# Patient Record
Sex: MALE | Race: WHITE
[De-identification: names, ages, dates, MRNs, and addresses within clinical notes are randomized per-mention and may not be internally consistent; named-entity substitution may affect disease eponyms.]

---

## 2017-03-24 ENCOUNTER — HOSPITAL ENCOUNTER (EMERGENCY)
Dept: HOSPITAL 10 - FTE | Age: 42
Discharge: HOME | End: 2017-03-24
Payer: MEDICAID

## 2017-03-24 VITALS
HEIGHT: 64 IN | HEIGHT: 64 IN | WEIGHT: 216.05 LBS | BODY MASS INDEX: 36.89 KG/M2 | BODY MASS INDEX: 36.89 KG/M2 | WEIGHT: 216.05 LBS

## 2017-03-24 DIAGNOSIS — H57.11: Primary | ICD-10-CM

## 2017-03-24 PROCEDURE — 99283 EMERGENCY DEPT VISIT LOW MDM: CPT

## 2017-05-12 ENCOUNTER — HOSPITAL ENCOUNTER (EMERGENCY)
Dept: HOSPITAL 10 - E/R | Age: 42
Discharge: HOME | End: 2017-05-12
Payer: MEDICAID

## 2017-05-12 VITALS
WEIGHT: 224.87 LBS | HEIGHT: 70 IN | BODY MASS INDEX: 32.19 KG/M2 | BODY MASS INDEX: 32.19 KG/M2 | WEIGHT: 224.87 LBS | HEIGHT: 70 IN

## 2017-05-12 VITALS — HEART RATE: 74 BPM | DIASTOLIC BLOOD PRESSURE: 68 MMHG | RESPIRATION RATE: 17 BRPM | SYSTOLIC BLOOD PRESSURE: 101 MMHG

## 2017-05-12 DIAGNOSIS — R40.2252: ICD-10-CM

## 2017-05-12 DIAGNOSIS — R07.2: Primary | ICD-10-CM

## 2017-05-12 DIAGNOSIS — R40.2362: ICD-10-CM

## 2017-05-12 DIAGNOSIS — R40.2142: ICD-10-CM

## 2017-05-12 DIAGNOSIS — R07.9: ICD-10-CM

## 2017-05-12 LAB
ADD SCAN DIFF: NO
ANION GAP SERPL CALC-SCNC: 14 MMOL/L (ref 8–16)
APTT BLD: 30.6 SEC (ref 25–35)
BARBITURATES UR-MCNC: NEGATIVE UG/ML
BASOPHILS # BLD AUTO: 0 10^3/UL (ref 0–0.1)
BASOPHILS NFR BLD: 0.4 % (ref 0–2)
BENZODIAZ UR-MCNC: NEGATIVE UG/L
BUN SERPL-MCNC: 10 MG/DL (ref 7–20)
CALCIUM SERPL-MCNC: 8.9 MG/DL (ref 8.4–10.2)
CANNABINOIDS UR-MCNC: NEGATIVE UG/L
CHLORIDE SERPL-SCNC: 103 MMOL/L (ref 97–110)
CO2 SERPL-SCNC: 25 MMOL/L (ref 21–31)
COCAINE UR-MCNC: NEGATIVE NG/ML
CREAT SERPL-MCNC: 0.68 MG/DL (ref 0.61–1.24)
D DIMER PPP FEU-MCNC: 285.86 NG/ML (ref ?–460)
EOSINOPHIL # BLD: 0.3 10^3/UL (ref 0–0.5)
EOSINOPHIL NFR BLD: 3.3 % (ref 0–7)
ERYTHROCYTE [DISTWIDTH] IN BLOOD BY AUTOMATED COUNT: 11.9 % (ref 11.5–14.5)
ETHANOL SERPL-MCNC: < 10 MG/DL
GLUCOSE SERPL-MCNC: 106 MG/DL (ref 70–220)
HCT VFR BLD CALC: 45.5 % (ref 42–52)
HGB BLD-MCNC: 16 G/DL (ref 14–18)
INR PPP: 0.88
LYMPHOCYTES # BLD AUTO: 3 10^3/UL (ref 0.8–2.9)
LYMPHOCYTES NFR BLD AUTO: 39.5 % (ref 15–51)
MCH RBC QN AUTO: 30.7 PG (ref 29–33)
MCHC RBC AUTO-ENTMCNC: 35.2 G/DL (ref 32–37)
MCV RBC AUTO: 87.3 FL (ref 82–101)
MONOCYTES # BLD: 0.6 10^3/UL (ref 0.3–0.9)
MONOCYTES NFR BLD: 8.5 % (ref 0–11)
NEUTROPHILS # BLD: 3.6 10^3/UL (ref 1.6–7.5)
NEUTROPHILS NFR BLD AUTO: 47.9 % (ref 39–77)
NRBC # BLD MANUAL: 0 10^3/UL (ref 0–0)
NRBC BLD QL: 0 /100WBC (ref 0–0)
OPIATES UR-MCNC: NEGATIVE NG/ML
PLATELET # BLD: 249 10^3/UL (ref 140–415)
PMV BLD AUTO: 9.9 FL (ref 7.4–10.4)
POTASSIUM SERPL-SCNC: 3.4 MMOL/L (ref 3.5–5.1)
PROTHROMBIN TIME: 11.9 SEC (ref 12.2–14.2)
PT RATIO: 0.9
RBC # BLD AUTO: 5.21 10^6/UL (ref 4.7–6.1)
SODIUM SERPL-SCNC: 139 MMOL/L (ref 135–144)
TROPONIN I SERPL-MCNC: < 0.012 NG/ML (ref 0–0.12)
WBC # BLD AUTO: 7.5 10^3/UL (ref 4.8–10.8)

## 2017-05-12 PROCEDURE — 85610 PROTHROMBIN TIME: CPT

## 2017-05-12 PROCEDURE — C9113 INJ PANTOPRAZOLE SODIUM, VIA: HCPCS

## 2017-05-12 PROCEDURE — 71010: CPT

## 2017-05-12 PROCEDURE — 84484 ASSAY OF TROPONIN QUANT: CPT

## 2017-05-12 PROCEDURE — 80306 DRUG TEST PRSMV INSTRMNT: CPT

## 2017-05-12 PROCEDURE — 85730 THROMBOPLASTIN TIME PARTIAL: CPT

## 2017-05-12 PROCEDURE — 85378 FIBRIN DEGRADE SEMIQUANT: CPT

## 2017-05-12 PROCEDURE — 85025 COMPLETE CBC W/AUTO DIFF WBC: CPT

## 2017-05-12 PROCEDURE — 93005 ELECTROCARDIOGRAM TRACING: CPT

## 2017-05-12 PROCEDURE — 96374 THER/PROPH/DIAG INJ IV PUSH: CPT

## 2017-05-12 PROCEDURE — 80048 BASIC METABOLIC PNL TOTAL CA: CPT

## 2017-05-12 PROCEDURE — 36415 COLL VENOUS BLD VENIPUNCTURE: CPT

## 2017-05-12 PROCEDURE — 96375 TX/PRO/DX INJ NEW DRUG ADDON: CPT

## 2017-05-12 PROCEDURE — 80307 DRUG TEST PRSMV CHEM ANLYZR: CPT

## 2017-05-12 NOTE — RADRPT
PROCEDURE:   XR Chest. 

 

CLINICAL INDICATION:   Chest Pain. 

 

TECHNIQUE:   Portable single view of the chest

 

COMPARISON:   None. 

 

FINDINGS:

The cardiomediastinal silhouette appears within normal limits.  The lungs are clear and no pleural e
ffusion or significant edema is seen.  No bony abnormality is seen. 

 

IMPRESSION:

No definite acute pulmonary disease. 

 

RPTAT: HLBE

_____________________________________________ 

Alyssa Li Physician           Date    Time 

Electronically viewed and signed by Alyssa Li Physician on 05/12/2017 03:10 

 

D:  05/12/2017 03:10  T:  05/12/2017 03:10

LE/

## 2017-05-12 NOTE — ERA
ER Documentation


Chief Complaint


Date/Time


DATE: 17 


TIME: 01:41


Chief Complaint


CHEST PRESSURE





HPI


The patient is a 41-year-old male, presenting to the ER because of substernal 

chest pressure for 1 day.  He has similar symptoms previously, the chest 

pressure is worse with movement.  He denies chest pain with exertion or 

vomiting or diaphoresis.  He denies fever, chills, neck pain, abdominal pain, 

vomiting, dysuria, diarrhea.  He does not smoke nor drink or use illicit drug





Past medical history/surgical history: None





ROS


All systems reviewed and are negative except as per history of present illness.





Medications


Home Meds


Active Scripts


Acetaminophen* (Tylophen*) 500 Mg Capsule, 1 CAP PO Q6H Y for PAIN AND OR 

ELEVATED TEMP, #20 CAP


   Prov:AUGUSTIN BISHOP PA-C         3/24/17


Naphazoline Hcl/Phenir Mal (Naphcon-A Eye Drops) 15 Ml Drops, 15 ML OP BID, #1 

BOTTLE


   Prov:AUGUSTIN BISHOP PA-C         3/24/17


Polymyxin B Sulfate-TMP* (Polymyxin B-TMP Eye Drops*) 10 Ml Drops, 1 DROP RIGHT 

EYE QID for 7 Days, EA


   Prov:AUGUSTIN BISHOP PA-C         3/24/17





Allergies


Allergies:  


Coded Allergies:  


     No Known Allergy (Unverified , 3/24/17)





PMhx/Soc


History of Surgery:  No


Anesthesia Reaction:  No


Hx Neurological Disorder:  No


Hx Respiratory Disorders:  No


Hx Cardiac Disorders:  No


Hx Psychiatric Problems:  No


Hx Miscellaneous Medical Probl:  No (DENIES MEDICAL AND SURGICAL HX.)


Hx Alcohol Use:  No


Hx Substance Use:  No


Hx Tobacco Use:  No





Physical Exam


Vitals





Vital Signs








  Date Time  Temp Pulse Resp B/P Pulse Ox O2 Delivery O2 Flow Rate FiO2


 


17 04:00  64 12 115/81 98 Room Air  


 


17 03:00  68 16 113/76 100 Room Air  


 


17 01:59  71 19 118/68 100 Room Air  


 


17 01:21 97.0 67 18 152/97 98   








Physical Exam


 Const:      No acute distress.


 Head:        Atraumatic.


 Eyes:       Normal Conjunctiva.


 ENT:         Normal External Ears, Nose and Mouth.


 Neck:        Full range of motion.  No meningismus.


 Resp:         Clear to auscultation bilaterally.


 Cardio:       Regular rate and rhythm, no murmurs.


 Abd:         Soft,  non distended, normal bowel sounds, non tender.


 Skin:         No petechiae or rashes.


 Back:        No midline or flank tenderness.


 Ext:          No cyanosis, or edema.


 Neur:        Awake and alert. No focal deficit


 Psych:        Normal Mood and Affect.


Result Diagram:  


17 0200                                                                   

             17 0200





Results 24 hrs








 Laboratory Tests








Test


  17


02:00 17


02:10


 


White Blood Count 7.510^3/ul  


 


Red Blood Count 5.2110^6/ul  


 


Hemoglobin 16.0g/dl  


 


Hematocrit 45.5%  


 


Mean Corpuscular Volume 87.3fl  


 


Mean Corpuscular Hemoglobin 30.7pg  


 


Mean Corpuscular Hemoglobin


Concent 35.2g/dl 


  


 


 


Red Cell Distribution Width 11.9%  


 


Platelet Count 90345^3/UL  


 


Mean Platelet Volume 9.9fl  


 


Neutrophils % 47.9%  


 


Lymphocytes % 39.5%  


 


Monocytes % 8.5%  


 


Eosinophils % 3.3%  


 


Basophils % 0.4%  


 


Nucleated Red Blood Cells % 0.0/100WBC  


 


Neutrophils # 3.610^3/ul  


 


Lymphocytes # 3.010^3/ul  


 


Monocytes # 0.610^3/ul  


 


Eosinophils # 0.310^3/ul  


 


Basophils # 0.010^3/ul  


 


Nucleated Red Blood Cells # 0.010^3/ul  


 


Prothrombin Time 11.9Sec  


 


Prothrombin Time Ratio 0.9  


 


INR International Normalized


Ratio 0.88 


  


 


 


Activated Partial


Thromboplast Time 30.6Sec 


  


 


 


D-Dimer 285.86ng/ml  


 


D-Dimer Comment   


 


Sodium Level 139mmol/L  


 


Potassium Level 3.4mmol/L  


 


Chloride Level 103mmol/L  


 


Carbon Dioxide Level 25mmol/L  


 


Anion Gap 14  


 


Blood Urea Nitrogen 10mg/dl  


 


Creatinine 0.68mg/dl  


 


Glucose Level 106mg/dl  


 


Calcium Level 8.9mg/dl  


 


Troponin I < 0.012ng/ml  


 


Ethyl Alcohol Level < 10.0mg/dl  


 


Urine Opiates Screen  NEGATIVE 


 


Urine Barbiturates  NEGATIVE 


 


Urine Amphetamines Screen  NEGATIVE 


 


Urine Benzodiazepines Screen  NEGATIVE 


 


Urine Cocaine Screen  NEGATIVE 


 


Urine Cannabinoids  NEGATIVE 








 Current Medications








 Medications


  (Trade)  Dose


 Ordered  Sig/Mary


 Route


 PRN Reason  Start Time


 Stop Time Status Last Admin


Dose Admin


 


 Ketorolac


 Tromethamine


  (Toradol)  30 mg  ONCE  STAT


 IV


   17 01:45


 17 01:47 DC 17 02:07


 


 


 Pantoprazole


  (Protonix Iv)  40 mg  ONCE  ONCE


 IV


   17 02:00


 17 02:01 DC 17 02:07


 


 


 Potassium Chloride


  (Klor-Con 20)  20 meq  ONCE  ONCE


 PO


   17 03:32


 17 03:33 DC  


 














Procedures/[place] [id number]


 Radiology Main Line: [phone]





 DIAGNOSTIC IMAGING REPORT





 Patient: TARAN DALE   : 1975   Age: 41  Sex: M                  

      


 MR #:    [id number]   Acct #:   [id number]    DOS: 17 0145


 Ordering MD: TRINH CHAMBERS MD   Location:  E/R   Room/Bed:                  

                          


 








PROCEDURE:   XR Chest. 


 


CLINICAL INDICATION:   Chest Pain. 


 


TECHNIQUE:   Portable single view of the chest


 


COMPARISON:   None. 


 


FINDINGS:


The cardiomediastinal silhouette appears within normal limits.  The lungs are 

clear and no pleural effusion or significant edema is seen.  No bony 

abnormality is seen. 


 


IMPRESSION:


No definite acute pulmonary disease. 


 


RPTAT: HLBE


_____________________________________________ 


Physician Haritha           Date    Time 


Electronically viewed and signed by Alyssa Li Physician on 2017 03

:10 


 


D:  2017 03:10  T:  2017 03:10


LE/





CC: TRINH CHAMBERS MD





EKG:                At 1:36 AM read by emergency physician


Rate/Rhythm:          Normal Sinus Rhythm 71 beats/min


QRS, ST, T-waves:    No ST elevation, no T inversion


Impression:            Normal    EKG





EKG:                At 2:07 AM read by emergency physician


Rate/Rhythm:          Normal Sinus Rhythm 72 beats/min


QRS, ST, T-waves:    No ST elevation, no T inversion


Impression:            Normal    EKG





MEDICAL MAKING DECISION: The patient is a 41-year-old male, presenting to the 

ER because of acute chest pain of unclear etiology.  He was treated with 

Toradol 30 minute IV and Protonix 40 mg IV with good response





The differential diagnoses considered include but are not limited to acute 

coronary syndrome, acute myocardial infarction, pericarditis, pulmonary embolism

, aortic dissection, pneumonia, pleural effusion, pneumothorax, GERD, chest 

wall pain.





Departure


Diagnosis:  


 Primary Impression:  


 Chest pain


Condition:  Good


Comments


The patient presents with chest pain and I considered pulmonary embolism, 

aortic dissection, pneumothorax among other diagnoses.  Evaluation for acute 

coronary syndrome was performed.  The HEART score (www.mdcalc.com) was utilized 

for risk stratification and found to be < = 3.  Repeat EKG and troponin @ 3 

hours were unchanged.  Based on this evaluation the patients risk of major 

adverse cardiac events is <1%.  Shared decision making occurred with patient 

and the decision has been made to discharge the patient for outpatient 

evaluation and functional study within 72 hours.











TRINH CHAMBERS MD May 12, 2017 01:41

## 2017-06-09 ENCOUNTER — HOSPITAL ENCOUNTER (EMERGENCY)
Dept: HOSPITAL 10 - FTE | Age: 42
Discharge: HOME | End: 2017-06-09
Payer: MEDICAID

## 2017-06-09 VITALS — RESPIRATION RATE: 20 BRPM | DIASTOLIC BLOOD PRESSURE: 82 MMHG | HEART RATE: 94 BPM | SYSTOLIC BLOOD PRESSURE: 125 MMHG

## 2017-06-09 VITALS
BODY MASS INDEX: 35.25 KG/M2 | HEIGHT: 66 IN | BODY MASS INDEX: 35.25 KG/M2 | WEIGHT: 219.36 LBS | WEIGHT: 219.36 LBS | HEIGHT: 66 IN

## 2017-06-09 VITALS — TEMPERATURE: 98.7 F

## 2017-06-09 DIAGNOSIS — J20.9: Primary | ICD-10-CM

## 2017-06-09 DIAGNOSIS — R05: ICD-10-CM

## 2017-06-09 DIAGNOSIS — Z87.891: ICD-10-CM

## 2017-06-09 PROCEDURE — 96372 THER/PROPH/DIAG INJ SC/IM: CPT

## 2017-06-09 PROCEDURE — 71020: CPT

## 2017-06-09 PROCEDURE — 94664 DEMO&/EVAL PT USE INHALER: CPT

## 2017-06-09 NOTE — RADRPT
PROCEDURE:   XR Chest. 

 

CLINICAL INDICATION:   Cough and fever. 

 

TECHNIQUE:   PA and Lateral views of the chest were obtained. 

 

COMPARISON:   No. 

 

FINDINGS:

The soft tissues are normal.  The bony elements are normal.  The heart, cardiomediastinal silhouette
 and hilar structures are normal. The pulmonary vasculature is normal. There is a left-sided aorta. 
The lungs are clear.  There is thickening of the minor fissure which could be the result of a pleura
l effusion.  The costophrenic angles are normal.

 

IMPRESSION:

 

1. Thickening of the minor fissure could be the result of a right pleural effusion or pleural scarri
ng.  No discrete infiltrate is identified.

 

RPTAT:AAJJ

_____________________________________________ 

Physician Fish           Date    Time 

Electronically viewed and signed by Physician Fish on 06/09/2017 19:41 

 

D:  06/09/2017 19:41  T:  06/09/2017 19:41

SERGE/

## 2017-06-09 NOTE — ERD
ER Documentation


Chief Complaint


Date/Time


DATE: 17 


TIME: 18:43


Chief Complaint


Complains of fever x  3 weeks





HPI


42-year-old male who presented emergency department for productive cough and 

intermittent fever for about 3 weeks.  He complains of right upper back pain 

that is described as achy nonradiating.  Stated that his right upper back pain 

is worse whenever he cough.





Denies headache, loss of consciousness, dizziness, blurry vision, changes in 

vision, photophobia, facial pain, ear pain, throat pain, difficulty swallowing, 

neck pain, shoulder pain, chest pain, hemoptysis, abdominal pain, loss of 

appetite, nausea, vomiting, hematochezia, diarrhea, constipation, urinary 

symptoms, bladder and bowel incontinences, extremity weakness, extremity 

tenderness, numbness or tingling sensation, difficulty walking, recent travel, 

recent exposure to illness, recent antibiotic use in the last 3 months. 





Allergy: NKDA.


PMH: Denies. 


Medications: Denies.  


Surgery: Denies.  


Family history: Denies.


Primary Social History: Denies.


Denies smoking, use of alcohol, use of illegal drugs.





ROS


All systems reviewed and are negative except as per history of present illness.





Medications


Home Meds


Active Scripts


Albuterol Sulfate* (Proair HFA*) 8.5 Gm Hfa.aer.ad, 2 PUFF INH Q4, #1 INHALER


   Prov:FRANCHESKAILADALLASRAMÍREZ F         17


Ibuprofen* (Motrin*) 800 Mg Tab, 800 MG PO Q8 Y for PAIN AND OR ELEVATED TEMP, #

30 TAB


   Prov:FRANCHESKAILADALLASDERICKAR F         17


Acetaminophen* (Tylophen*) 500 Mg Capsule, 1 CAP PO Q6H Y for PAIN AND OR 

ELEVATED TEMP, #20 CAP


   Prov:PASILABANRAMÍREZ F         17


Azithromycin* (Zithromax*) 250 Mg Tablet, 250 MG PO .ZPACK AS DIRECTED, #6 TAB


   TAKE 500 MG (2 TABS) THE FIRST DAY THEN 250 MG (1 TAB) DAYS 2-5


   Prov:FRANCHESKAILADERICK HAYNESAR F         17


Ibuprofen* (Motrin*) 600 Mg Tab, 600 MG PO Q6H Y for PAIN AND OR ELEVATED TEMP, 

#30 TAB


   Prov:TRINH CHAMBERS MD         17


Acetaminophen* (Tylophen*) 500 Mg Capsule, 1 CAP PO Q6H Y for PAIN AND OR 

ELEVATED TEMP, #20 CAP


   Prov:AUGUSTIN BISHOP CATA         3/24/17


Naphazoline Hcl/Phenir Mal (Naphcon-A Eye Drops) 15 Ml Drops, 15 ML OP BID, #1 

BOTTLE


   Prov:AUGUSTIN BISHOP CATA         3/24/17


Polymyxin B Sulfate-TMP* (Polymyxin B-TMP Eye Drops*) 10 Ml Drops, 1 DROP RIGHT 

EYE QID for 7 Days, EA


   Prov:AUGUSTIN BISHOP CATA         3/24/17





Allergies


Allergies:  


Coded Allergies:  


     No Known Allergy (Unverified , 3/24/17)





PMhx/Soc


Medical and Surgical Hx:  pt denies Medical Hx, pt denies Surgical Hx


History of Surgery:  No


Anesthesia Reaction:  No


Hx Neurological Disorder:  No


Hx Respiratory Disorders:  No


Hx Cardiac Disorders:  No


Hx Psychiatric Problems:  No


Hx Miscellaneous Medical Probl:  No


Hx Alcohol Use:  No


Hx Substance Use:  No


Hx Tobacco Use:  No (quit)


Smoking Status:  Former smoker





FmHx


Family History:  coronary disease





Physical Exam


Vitals





Vital Signs








  Date Time  Temp Pulse Resp B/P Pulse Ox O2 Delivery O2 Flow Rate FiO2


 


17 21:50 98.7       


 


17 21:22 98.5 94 20 125/82 97 Room Air  


 


17 20:13  74 22  97   21


 


17 18:16 100.6 102 20 132/63 93   








Physical Exam


CONSTITUTIONAL: Well-appearing; well-nourished; in no apparent distress.  


HEAD: Normocephalic; atraumatic.  


EYES: Conjunctiva clear, sclera non-icteric, EOM intact. PERRL 


Ears: Hearing intact. EACs clear, TMs non-bulging, non-inflamed, translucent & 

mobile, ossicles normal appearance, No obstructions, no erythema, no discharges


Nose: No obstructions. No polyps. No external lesions. Mucosa non-inflamed. No 

external lesions, septum and turbinates normal. No rhinorrhea. No discharges. 

Frontal sinus is non-tender to palpation. Maxillary sinus is non-tender to 

palpation. 


MOUTH: Moist mucous membranes, no lesion, no obstructions, no vesicles, no 

thrush, patent airway


Throat: Uvula in midline. Right tonsil is +1 with no erythema, no exudate. Left 

tonsil is +1 with no erythema, no exudate. Tolerating secretions well. Good gag 

reflex. Patent airway.


Neck: Supple, without lesions, bruits, or adenopathy. No mass. Thyroid non-

enlarged and non-tender to palpation.


CHEST: Symmetrical chest. Respirations even and not labored. No retractions 

noted.


CARDIOVASCULAR: Normal S1, S2. RRR. No murmurs, gallops.


RESPIRATORY: Normal chest excursion with respiration; breath sounds clear and 

equal bilaterally; no wheezes, rhonchi, or rales.  Breathing even and 

unlabored. Speaking in clear, full, and complete sentences w/ ease. 


ABDOMEN: Normal bowel sounds normal. Soft, round, non-distended, non-guarding, 

no tenderness, no rebound, no organomegaly, no masses, no pulsating abdominal 

mass. No hernia. No peritoneal signs.


: No CVA tenderness. 


BACK: Symmetrical shoulder. Spine is midline without deformity, tenderness. No 

evidence of trauma or deformity.


PELVIS: Stable pelvis. No evidence of trauma or deformity.  


MUSCULOSKELETAL: Normal gait and station. No misalignment, asymmetry, 

crepitation, defects, tenderness, masses, effusions, decreased range of motion, 

instability, atrophy or abnormal strength or tone in the head, neck, spine, ribs

, pelvis or extremities. No calf tenderness. 


NEUROVASCULAR: Distal pulses are present. Pedal pulse are present, equal, and 

normal. Capillary refills are < 2 seconds.


NEUROLOGIC: Alert and oriented x4. Speaks full and clear sentences. Cranial 

Nerves II-XII normal. Sensation to pain, touch, and proprioception normal. 

Grossly unremarkable. No neurologic deficits. Romberg test is negative.


PSYCHOLOGICAL: The patients mood and manner are appropriate. No hallucinations

, delusions. Not SI. Not HI. Has the capacity to decide for self


SKIN: Normal for age and ethnicity; warm; dry; good turgor; no apparent lesions 

or exudates. No rashes, hives, discoloration. Intact.


Results 24 hrs





 Current Medications








 Medications


  (Trade)  Dose


 Ordered  Sig/Mary


 Route


 PRN Reason  Start Time


 Stop Time Status Last Admin


Dose Admin


 


 Acetaminophen


  (Tylenol Tab)  500 mg  ONCE  STAT


 PO


   17 18:49


 17 18:50 DC 17 18:56


 


 


 Ibuprofen


  (Motrin)  800 mg  ONCE  ONCE


 PO


   17 19:00


 17 19:01 DC 17 18:56


 


 


 Albuterol


  (Proventil


 0.083% (Neb))  5 mg  ONCE  STAT


 HHN


   17 19:38


 17 19:39 DC 17 20:12


 


 


 Ipratropium


 Bromide


  (Atrovent 0.02%


  (Neb))  0.5 mg  ONCE  ONCE


 HHN


   17 20:00


 17 20:01 DC 17 20:12


 


 


 Ceftriaxone Sodium


  (Rocephin)  1 gm  ONCE  ONCE


 IM


   17 20:00


 17 20:01 DC 17 19:50


 


 


 Dexamethasone


  (Decadron)  10 mg  ONCE  ONCE


 PO


   17 20:00


 17 20:01 DC 17 19:50


 











Procedures/MDM


Examination: Please see physical examination.





Disease process, medical treatment was explained to the patient and family 

member. They verbalized understanding and agreed with the diagnostic tests, 

medical treatment, and follow-up care.





Radiology:


Chest x-ray


Impression: Thickening of the minor fissure could be the result of a right 

pleural effusion or pleural scarring.  No discrete infiltrate is identified.





Blood works unremarkable except





Treatment: Tylenol.  Motrin.  Albuterol and Atrovent.  Ceftriaxone 1 g IM.  

Decadron 10 mg p.o.





Re-evaluation: Denies headache, dizziness, blurry vision, neck pain, shoulder 

pain, chest pain, back pain, abdominal pain, nausea.  No episode of emesis in 

the emergency department.  Respirations even and unlabored.  Lung sounds are 

clear to auscultation.  There is no abdominal tenderness on light and deep 

palpation.  No CVA tenderness.  Stated that he feels much better this time.





Consultation: None.





Differential diagnosis: Pneumonia versus bronchitis versus cough





Medical decision makin-year-old male who presented emergency department 

for productive cough cough and intermittent fever for about 3 weeks.  He 

complains of right upper back pain that is described as achy nonradiating.  

Stated that his right upper back pain is worse whenever he coughs.  Patient's 

complaint, patient's history about his complaint, my physical findings, 

diagnostic test results, my reevaluation are consistent my final diagnosis of 

bronchitis.


Case was discussed with supervising physician, Dr. Tod Hines who agreed with 

my medical decision making to discharge patient with a final diagnosis of 

bronchitis





Medications prescribed are the following: Azithromycin.  Pro-air.  Tylenol.





Patient and family member are made aware of the side effects and adverse 

reactions of the medications prescribed. Instructed on when to seek emergent 

and medical attention in case allergic/anaphylactic reactions or severe side 

effects and or adverse reactions to medications. Patient and family member 

verbalized understanding. 





Patient instructed


Instructed to follow-up with his PCP in 24-48 hours. 


Instructed to Call 911 for chest pain, shortness of breath. Advised to come 

back here in ED as soon as possible for severity of symptoms which includes but 

not limited to: any new symptoms; shortness of breath/difficulty of breathing; 

cardiovascular changes; severe gastrointestinal symptoms; signs and symptoms of 

bleeding and or infection; signs of compartment syndrome/neurovascular changes; 

neurological changes/deficits. 


Patient and family member verbalized understanding. 





Upon discharge, patient is alert and oriented x 4, speaks full and clear 

sentences, denies pain, has no neurological deficits, has no neurovascular 

deficits, difficulty of breathing. Breathing even and unlabored. Lung sounds 

are clear to auscultation. Not in distress. Appears comfortable.  Ambulatory 

with steady gait. Appears satisfied with care provided here in ED.





Departure


Diagnosis:  


 Primary Impression:  


 Bronchitis


 Additional Impression:  


 Fever


Condition:  Fair





Additional Instructions:  


Instructed to follow-up with his PCP in 24-48 hours. 


Instructed to Call 911 for chest pain, shortness of breath. Advised to come 

back here in ED as soon as possible for severity of symptoms which includes but 

not limited to: any new symptoms; shortness of breath/difficulty of breathing; 

cardiovascular changes; severe gastrointestinal symptoms; signs and symptoms of 

bleeding and or infection; signs of compartment syndrome/neurovascular changes; 

neurological changes/deficits. 


Patient and family member verbalized understanding.











RAMÍREZ WILEY 2017 18:43

## 2018-04-29 ENCOUNTER — HOSPITAL ENCOUNTER (EMERGENCY)
Dept: HOSPITAL 91 - E/R | Age: 43
LOS: 1 days | Discharge: HOME | End: 2018-04-30
Payer: MEDICAID

## 2018-04-29 ENCOUNTER — HOSPITAL ENCOUNTER (EMERGENCY)
Age: 43
LOS: 1 days | Discharge: HOME | End: 2018-04-30

## 2018-04-29 DIAGNOSIS — R07.89: Primary | ICD-10-CM

## 2018-04-29 PROCEDURE — 99283 EMERGENCY DEPT VISIT LOW MDM: CPT

## 2018-04-29 PROCEDURE — 93005 ELECTROCARDIOGRAM TRACING: CPT

## 2018-04-30 RX ADMIN — ALUMINUM HYDROXIDE, MAGNESIUM HYDROXIDE, DIMETHICONE 1 ML: 200; 200; 20 SUSPENSION ORAL at 02:00

## 2018-06-19 ENCOUNTER — HOSPITAL ENCOUNTER (EMERGENCY)
Dept: HOSPITAL 91 - FTE | Age: 43
Discharge: HOME | End: 2018-06-19
Payer: COMMERCIAL

## 2018-06-19 ENCOUNTER — HOSPITAL ENCOUNTER (EMERGENCY)
Age: 43
Discharge: HOME | End: 2018-06-19

## 2018-06-19 DIAGNOSIS — Z23: ICD-10-CM

## 2018-06-19 DIAGNOSIS — S61.216A: Primary | ICD-10-CM
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
## 2018-06-19 PROCEDURE — 99283 EMERGENCY DEPT VISIT LOW MDM: CPT

## 2018-06-19 PROCEDURE — 12001 RPR S/N/AX/GEN/TRNK 2.5CM/<: CPT

## 2018-06-19 PROCEDURE — 90715 TDAP VACCINE 7 YRS/> IM: CPT

## 2018-06-19 PROCEDURE — 90471 IMMUNIZATION ADMIN: CPT

## 2018-06-19 RX ADMIN — CLOSTRIDIUM TETANI TOXOID ANTIGEN (FORMALDEHYDE INACTIVATED), CORYNEBACTERIUM DIPHTHERIAE TOXOID ANTIGEN (FORMALDEHYDE INACTIVATED), BORDETELLA PERTUSSIS TOXOID ANTIGEN (GLUTARALDEHYDE INACTIVATED), BORDETELLA PERTUSSIS FILAMENTOUS HEMAGGLUTININ ANTIGEN (FORMALDEHYDE INACTIVATED), BORDETELLA PERTUSSIS PERTACTIN ANTIGEN, AND BORDETELLA PERTUSSIS FIMBRIAE 2/3 ANTIGEN 1 ML: 5; 2; 2.5; 5; 3; 5 INJECTION, SUSPENSION INTRAMUSCULAR at 17:09

## 2018-06-19 RX ADMIN — LIDOCAINE HYDROCHLORIDE 1 ML: 10 INJECTION, SOLUTION INFILTRATION; PERINEURAL at 17:29
